# Patient Record
Sex: MALE | Employment: FULL TIME | URBAN - METROPOLITAN AREA
[De-identification: names, ages, dates, MRNs, and addresses within clinical notes are randomized per-mention and may not be internally consistent; named-entity substitution may affect disease eponyms.]

---

## 2018-03-13 PROCEDURE — 82607 VITAMIN B-12: CPT | Performed by: INTERNAL MEDICINE

## 2018-03-13 PROCEDURE — 84402 ASSAY OF FREE TESTOSTERONE: CPT | Performed by: INTERNAL MEDICINE

## 2018-03-13 PROCEDURE — 84403 ASSAY OF TOTAL TESTOSTERONE: CPT | Performed by: INTERNAL MEDICINE

## 2018-03-13 PROCEDURE — 82746 ASSAY OF FOLIC ACID SERUM: CPT | Performed by: INTERNAL MEDICINE

## 2018-07-26 ENCOUNTER — OFFICE VISIT (OUTPATIENT)
Dept: OTOLARYNGOLOGY | Facility: CLINIC | Age: 23
End: 2018-07-26

## 2018-07-26 VITALS
TEMPERATURE: 97 F | DIASTOLIC BLOOD PRESSURE: 75 MMHG | WEIGHT: 205 LBS | HEIGHT: 74 IN | SYSTOLIC BLOOD PRESSURE: 120 MMHG | BODY MASS INDEX: 26.31 KG/M2

## 2018-07-26 DIAGNOSIS — R68.2 DRY MOUTH: Primary | ICD-10-CM

## 2018-07-26 PROCEDURE — 99212 OFFICE O/P EST SF 10 MIN: CPT | Performed by: OTOLARYNGOLOGY

## 2018-07-26 PROCEDURE — 99243 OFF/OP CNSLTJ NEW/EST LOW 30: CPT | Performed by: OTOLARYNGOLOGY

## 2018-07-26 RX ORDER — CEVIMELINE HYDROCHLORIDE 30 MG/1
30 CAPSULE ORAL 3 TIMES DAILY
Qty: 90 CAPSULE | Refills: 0 | Status: SHIPPED | OUTPATIENT
Start: 2018-07-26 | End: 2018-08-22

## 2018-07-26 NOTE — PROGRESS NOTES
Lexx Hoffman is a 24year old male. Patient presents with:  Mouth/Lip Problem: c/o dry mouth for a year      HISTORY OF PRESENT ILLNESS  He presents with a history of dry mouth for the last year.   This all seems to be a result of having used Accutane in Negative Blurred vision and vision changes. Respiratory Negative Dyspnea and wheezing. Cardio Negative Chest pain, irregular heartbeat/palpitations and syncope. GI Negative Abdominal pain and diarrhea.    Endocrine Negative Cold intolerance and heat i daily., Disp: 90 capsule, Rfl: 0  •  ClomiPRAMINE HCl 25 MG Oral Cap, Take 1 capsule (25 mg total) by mouth nightly., Disp: 30 capsule, Rfl: 0  •  BuPROPion HCl ER, XL, (WELLBUTRIN XL) 150 MG Oral Tablet 24 Hr, Take 1 tablet (150 mg total) by mouth every m

## 2018-08-15 PROCEDURE — 86038 ANTINUCLEAR ANTIBODIES: CPT | Performed by: INTERNAL MEDICINE

## 2018-08-15 PROCEDURE — 86160 COMPLEMENT ANTIGEN: CPT | Performed by: INTERNAL MEDICINE

## 2018-08-15 PROCEDURE — 81003 URINALYSIS AUTO W/O SCOPE: CPT | Performed by: INTERNAL MEDICINE

## 2018-08-15 PROCEDURE — 83516 IMMUNOASSAY NONANTIBODY: CPT | Performed by: INTERNAL MEDICINE

## 2018-08-15 PROCEDURE — 82570 ASSAY OF URINE CREATININE: CPT | Performed by: INTERNAL MEDICINE

## 2018-08-15 PROCEDURE — 86235 NUCLEAR ANTIGEN ANTIBODY: CPT | Performed by: INTERNAL MEDICINE

## 2018-08-15 PROCEDURE — 84156 ASSAY OF PROTEIN URINE: CPT | Performed by: INTERNAL MEDICINE

## 2018-08-23 RX ORDER — CEVIMELINE HYDROCHLORIDE 30 MG/1
30 CAPSULE ORAL 3 TIMES DAILY
Qty: 90 CAPSULE | Refills: 1 | Status: SHIPPED | OUTPATIENT
Start: 2018-08-23 | End: 2019-01-10

## 2018-08-29 PROBLEM — M35.00 SICCA SYNDROME (HCC): Status: ACTIVE | Noted: 2018-08-29

## 2018-08-29 PROBLEM — H16.229 KERATOCONJUNCTIVITIS SICCA NOT DUE TO SJOGREN'S SYNDROME: Status: RESOLVED | Noted: 2018-08-29 | Resolved: 2018-08-29

## 2018-08-29 PROBLEM — H16.229 KERATOCONJUNCTIVITIS SICCA NOT DUE TO SJOGREN'S SYNDROME: Status: ACTIVE | Noted: 2018-08-29

## 2019-01-11 RX ORDER — CEVIMELINE HYDROCHLORIDE 30 MG/1
30 CAPSULE ORAL 3 TIMES DAILY
Qty: 90 CAPSULE | Refills: 1 | Status: SHIPPED | OUTPATIENT
Start: 2019-01-11 | End: 2021-04-21

## 2019-02-02 ENCOUNTER — OFFICE VISIT (OUTPATIENT)
Dept: OTOLARYNGOLOGY | Facility: CLINIC | Age: 24
End: 2019-02-02
Payer: COMMERCIAL

## 2019-02-02 VITALS
HEIGHT: 75 IN | WEIGHT: 214 LBS | BODY MASS INDEX: 26.61 KG/M2 | SYSTOLIC BLOOD PRESSURE: 110 MMHG | DIASTOLIC BLOOD PRESSURE: 80 MMHG | TEMPERATURE: 98 F

## 2019-02-02 DIAGNOSIS — R68.2 DRY MOUTH: Primary | ICD-10-CM

## 2019-02-02 PROCEDURE — 99214 OFFICE O/P EST MOD 30 MIN: CPT | Performed by: OTOLARYNGOLOGY

## 2019-02-02 PROCEDURE — 99212 OFFICE O/P EST SF 10 MIN: CPT | Performed by: OTOLARYNGOLOGY

## 2019-02-02 NOTE — PROGRESS NOTES
oYlette Perdomo is a 24year old male. Patient presents with:  Mouth/Lip Problem: Dry mouth f/u      HISTORY OF PRESENT ILLNESS  He presents with a history of dry mouth for the last year.   This all seems to be a result of having used Accutane in the past. use: Yes        Frequency: 1.0 times per week        Types: Cannabis        Comment: None current      Family History   Problem Relation Age of Onset   • Cancer Maternal Grandmother    • Diabetes Maternal Grandmother    • Cancer Paternal Grandfather    • D Right: Normal, Left: Normal. Canal - Right: Normal, Left: Normal. TM - Right: Normal, Left: Normal.   Skin Normal Inspection - Normal.        Lymph Detail Normal Submental. Submandibular. Anterior cervical. Posterior cervical. Supraclavicular.         Nose/

## (undated) NOTE — LETTER
Karyle Pia, Md  [address] [place], [place]       07/26/18        Patient: Shamika Ellison   YOB: 1995   Date of Visit: 7/26/2018       Dear  Dr. Radhika Gallegos MD,      Thank you for referring Shamika Ellison to my practice.